# Patient Record
Sex: FEMALE | Race: WHITE
[De-identification: names, ages, dates, MRNs, and addresses within clinical notes are randomized per-mention and may not be internally consistent; named-entity substitution may affect disease eponyms.]

---

## 2019-02-21 ENCOUNTER — HOSPITAL ENCOUNTER (OUTPATIENT)
Dept: HOSPITAL 10 - GIL | Age: 29
Discharge: HOME | End: 2019-02-21
Attending: INTERNAL MEDICINE
Payer: COMMERCIAL

## 2019-02-21 ENCOUNTER — HOSPITAL ENCOUNTER (OUTPATIENT)
Dept: HOSPITAL 91 - GIL | Age: 29
Discharge: HOME | End: 2019-02-21
Payer: COMMERCIAL

## 2019-02-21 VITALS — SYSTOLIC BLOOD PRESSURE: 103 MMHG | RESPIRATION RATE: 15 BRPM | DIASTOLIC BLOOD PRESSURE: 59 MMHG

## 2019-02-21 VITALS
WEIGHT: 149.03 LBS | BODY MASS INDEX: 28.14 KG/M2 | HEIGHT: 61 IN | WEIGHT: 149.03 LBS | BODY MASS INDEX: 28.14 KG/M2 | HEIGHT: 61 IN

## 2019-02-21 VITALS — HEART RATE: 72 BPM | RESPIRATION RATE: 18 BRPM | SYSTOLIC BLOOD PRESSURE: 116 MMHG | DIASTOLIC BLOOD PRESSURE: 78 MMHG

## 2019-02-21 DIAGNOSIS — K29.50: Primary | ICD-10-CM

## 2019-02-21 PROCEDURE — 84703 CHORIONIC GONADOTROPIN ASSAY: CPT

## 2019-02-21 PROCEDURE — 88305 TISSUE EXAM BY PATHOLOGIST: CPT

## 2019-02-21 PROCEDURE — 88312 SPECIAL STAINS GROUP 1: CPT

## 2019-02-21 PROCEDURE — 43239 EGD BIOPSY SINGLE/MULTIPLE: CPT

## 2019-02-21 NOTE — CONS
DATE OF ADMISSION: 02/21/2019

DATE OF CONSULTATION:  

 

 

 

PATIENT NAME: SARAH KIRKLAND

 

Dear Dr. Solorzano:

 

Thank you very much for this kind referral.

 

Ms. Sarah Kirkland is a 28-year-old female patient who has been referred to me for further evaluation of
 upper abdominal pain, not responding to therapy with omeprazole.  No past history of peptic ulcer di
sease.  Not on nonsteroidal anti-inflammatory agents.  No history of gallstones or liver disease.  Ap
petite is good and no weight loss.  The patient also gives history of constipation and she has been t
aking MiraLax.  No rectal bleeding.  No past history of inflammatory bowel disease.  Not a hypertensi
ve or diabetic.  No heart disease, lung problem, or kidney disease.

 

SOCIAL HISTORY:  Nonsmoker.  No alcohol abuse.

 

FAMILY HISTORY:  No family history of gastrointestinal tract neoplasm.

 

ALLERGIES:  NO DRUG ALLERGIES.

 

MEDICATION:  MiraLax.

 

PHYSICAL EXAMINATION:

VITAL SIGNS:  She is 5 feet 1 inch tall and weighs 145 pounds.

HEART:  Normal heart sounds.

LUNGS:  Clear.

ABDOMEN:  Soft, no masses.  Slight epigastric tenderness.  Normal bowel sounds.

NEUROLOGIC:  Normal neurological exam.

 

IMPRESSION:

1.  Upper abdominal pain, not responding to therapy with omeprazole, and the patient has discontinued
 the medications.

2.  Constipation.  She is on MiraLax.

 

PLAN:  Endoscopic examination for further evaluation.

 

The procedure and possible complications are well explained to the patient, and she understands and c
onsents to the procedure.

 

Thank you once again.

 

With warmest personal regards,

 

 

Dictated By: ANDRÉS WEISS/SOBIA

DD:    01/30/2019 17:16:31

DT:    01/30/2019 17:53:51

Conf#: [id number]

DID#:  [id number]

CC: Dr. Solorzano;*EndCC*